# Patient Record
Sex: FEMALE | URBAN - METROPOLITAN AREA
[De-identification: names, ages, dates, MRNs, and addresses within clinical notes are randomized per-mention and may not be internally consistent; named-entity substitution may affect disease eponyms.]

---

## 2020-01-31 ENCOUNTER — OFFICE VISIT (OUTPATIENT)
Dept: URBAN - METROPOLITAN AREA CLINIC 22 | Facility: CLINIC | Age: 85
End: 2020-01-31
Payer: MEDICARE

## 2020-01-31 DIAGNOSIS — H35.3191 NONEXUDATIVE AGE-RELATED MACULAR DEGENERATION, EARLY DRY STAGE: ICD-10-CM

## 2020-01-31 DIAGNOSIS — H04.123 DRY EYE SYNDROME OF BILATERAL LACRIMAL GLANDS: Primary | ICD-10-CM

## 2020-01-31 PROCEDURE — 92014 COMPRE OPH EXAM EST PT 1/>: CPT | Performed by: OPTOMETRIST

## 2020-01-31 ASSESSMENT — INTRAOCULAR PRESSURE
OD: 11
OS: 14

## 2020-01-31 NOTE — IMPRESSION/PLAN
Impression: Nonexudative age-related macular degeneration, early dry stage: H35.3191. OU. Plan: Discussed diagnosis with patient. Will continue to monitor condition.

## 2020-01-31 NOTE — IMPRESSION/PLAN
Impression: Dry eye syndrome of bilateral lacrimal glands: H04.123 OU.  Plan: Pt is needing to start using Systain Complete drops 4 times a day  or as often as needed